# Patient Record
Sex: MALE | Race: ASIAN | NOT HISPANIC OR LATINO | URBAN - METROPOLITAN AREA
[De-identification: names, ages, dates, MRNs, and addresses within clinical notes are randomized per-mention and may not be internally consistent; named-entity substitution may affect disease eponyms.]

---

## 2020-04-02 ENCOUNTER — INPATIENT (INPATIENT)
Facility: HOSPITAL | Age: 75
LOS: 3 days | Discharge: HOME | End: 2020-04-06
Attending: HOSPITALIST | Admitting: HOSPITALIST
Payer: COMMERCIAL

## 2020-04-02 VITALS
RESPIRATION RATE: 20 BRPM | SYSTOLIC BLOOD PRESSURE: 139 MMHG | DIASTOLIC BLOOD PRESSURE: 72 MMHG | TEMPERATURE: 98 F | OXYGEN SATURATION: 91 % | HEART RATE: 76 BPM

## 2020-04-02 DIAGNOSIS — B34.9 VIRAL INFECTION, UNSPECIFIED: ICD-10-CM

## 2020-04-02 DIAGNOSIS — R63.0 ANOREXIA: ICD-10-CM

## 2020-04-02 DIAGNOSIS — R82.998 OTHER ABNORMAL FINDINGS IN URINE: ICD-10-CM

## 2020-04-02 DIAGNOSIS — R73.09 OTHER ABNORMAL GLUCOSE: ICD-10-CM

## 2020-04-02 DIAGNOSIS — R09.02 HYPOXEMIA: ICD-10-CM

## 2020-04-02 DIAGNOSIS — Z02.9 ENCOUNTER FOR ADMINISTRATIVE EXAMINATIONS, UNSPECIFIED: ICD-10-CM

## 2020-04-02 DIAGNOSIS — Z90.49 ACQUIRED ABSENCE OF OTHER SPECIFIED PARTS OF DIGESTIVE TRACT: Chronic | ICD-10-CM

## 2020-04-02 DIAGNOSIS — D72.829 ELEVATED WHITE BLOOD CELL COUNT, UNSPECIFIED: ICD-10-CM

## 2020-04-02 DIAGNOSIS — R68.89 OTHER GENERAL SYMPTOMS AND SIGNS: ICD-10-CM

## 2020-04-02 LAB
ALBUMIN SERPL ELPH-MCNC: 3.5 G/DL — SIGNIFICANT CHANGE UP (ref 3.3–5)
ALP SERPL-CCNC: 104 U/L — SIGNIFICANT CHANGE UP (ref 40–120)
ALT FLD-CCNC: 79 U/L — HIGH (ref 4–41)
ANION GAP SERPL CALC-SCNC: 13 MMO/L — SIGNIFICANT CHANGE UP (ref 7–14)
AST SERPL-CCNC: 84 U/L — HIGH (ref 4–40)
BASE EXCESS BLDV CALC-SCNC: 4.4 MMOL/L — SIGNIFICANT CHANGE UP
BASOPHILS # BLD AUTO: 0.04 K/UL — SIGNIFICANT CHANGE UP (ref 0–0.2)
BASOPHILS NFR BLD AUTO: 0.3 % — SIGNIFICANT CHANGE UP (ref 0–2)
BASOPHILS NFR SPEC: 0 % — SIGNIFICANT CHANGE UP (ref 0–2)
BILIRUB SERPL-MCNC: 0.7 MG/DL — SIGNIFICANT CHANGE UP (ref 0.2–1.2)
BLASTS # FLD: 0 % — SIGNIFICANT CHANGE UP (ref 0–0)
BLOOD GAS VENOUS - CREATININE: 0.73 MG/DL — SIGNIFICANT CHANGE UP (ref 0.5–1.3)
BUN SERPL-MCNC: 25 MG/DL — HIGH (ref 7–23)
CALCIUM SERPL-MCNC: 9.4 MG/DL — SIGNIFICANT CHANGE UP (ref 8.4–10.5)
CHLORIDE BLDV-SCNC: 103 MMOL/L — SIGNIFICANT CHANGE UP (ref 96–108)
CHLORIDE SERPL-SCNC: 99 MMOL/L — SIGNIFICANT CHANGE UP (ref 98–107)
CO2 SERPL-SCNC: 25 MMOL/L — SIGNIFICANT CHANGE UP (ref 22–31)
CREAT SERPL-MCNC: 0.79 MG/DL — SIGNIFICANT CHANGE UP (ref 0.5–1.3)
CRP SERPL-MCNC: 123.8 MG/L — HIGH
EOSINOPHIL # BLD AUTO: 0.01 K/UL — SIGNIFICANT CHANGE UP (ref 0–0.5)
EOSINOPHIL NFR BLD AUTO: 0.1 % — SIGNIFICANT CHANGE UP (ref 0–6)
EOSINOPHIL NFR FLD: 0 % — SIGNIFICANT CHANGE UP (ref 0–6)
FERRITIN SERPL-MCNC: 3295 NG/ML — HIGH (ref 30–400)
GAS PNL BLDV: 135 MMOL/L — LOW (ref 136–146)
GIANT PLATELETS BLD QL SMEAR: PRESENT — SIGNIFICANT CHANGE UP
GLUCOSE BLDV-MCNC: 177 MG/DL — HIGH (ref 70–99)
GLUCOSE SERPL-MCNC: 170 MG/DL — HIGH (ref 70–99)
HCO3 BLDV-SCNC: 27 MMOL/L — SIGNIFICANT CHANGE UP (ref 20–27)
HCT VFR BLD CALC: 48 % — SIGNIFICANT CHANGE UP (ref 39–50)
HCT VFR BLDV CALC: 51.1 % — HIGH (ref 39–51)
HGB BLD-MCNC: 15.9 G/DL — SIGNIFICANT CHANGE UP (ref 13–17)
HGB BLDV-MCNC: 16.7 G/DL — SIGNIFICANT CHANGE UP (ref 13–17)
IMM GRANULOCYTES NFR BLD AUTO: 2.3 % — HIGH (ref 0–1.5)
LACTATE BLDV-MCNC: 1.9 MMOL/L — SIGNIFICANT CHANGE UP (ref 0.5–2)
LDH SERPL L TO P-CCNC: 418 U/L — HIGH (ref 135–225)
LYMPHOCYTES # BLD AUTO: 0.64 K/UL — LOW (ref 1–3.3)
LYMPHOCYTES # BLD AUTO: 4.1 % — LOW (ref 13–44)
LYMPHOCYTES NFR SPEC AUTO: 0.9 % — LOW (ref 13–44)
MCHC RBC-ENTMCNC: 29.8 PG — SIGNIFICANT CHANGE UP (ref 27–34)
MCHC RBC-ENTMCNC: 33.1 % — SIGNIFICANT CHANGE UP (ref 32–36)
MCV RBC AUTO: 89.9 FL — SIGNIFICANT CHANGE UP (ref 80–100)
METAMYELOCYTES # FLD: 0 % — SIGNIFICANT CHANGE UP (ref 0–1)
MONOCYTES # BLD AUTO: 0.56 K/UL — SIGNIFICANT CHANGE UP (ref 0–0.9)
MONOCYTES NFR BLD AUTO: 3.6 % — SIGNIFICANT CHANGE UP (ref 2–14)
MONOCYTES NFR BLD: 0.9 % — LOW (ref 2–9)
MYELOCYTES NFR BLD: 0 % — SIGNIFICANT CHANGE UP (ref 0–0)
NEUTROPHIL AB SER-ACNC: 90.5 % — HIGH (ref 43–77)
NEUTROPHILS # BLD AUTO: 14.05 K/UL — HIGH (ref 1.8–7.4)
NEUTROPHILS NFR BLD AUTO: 89.6 % — HIGH (ref 43–77)
NEUTS BAND # BLD: 6 % — SIGNIFICANT CHANGE UP (ref 0–6)
NRBC # FLD: 0 K/UL — SIGNIFICANT CHANGE UP (ref 0–0)
OTHER - HEMATOLOGY %: 0 — SIGNIFICANT CHANGE UP
PCO2 BLDV: 45 MMHG — SIGNIFICANT CHANGE UP (ref 41–51)
PH BLDV: 7.42 PH — SIGNIFICANT CHANGE UP (ref 7.32–7.43)
PLATELET # BLD AUTO: 271 K/UL — SIGNIFICANT CHANGE UP (ref 150–400)
PLATELET COUNT - ESTIMATE: NORMAL — SIGNIFICANT CHANGE UP
PMV BLD: 10.3 FL — SIGNIFICANT CHANGE UP (ref 7–13)
PO2 BLDV: 33 MMHG — LOW (ref 35–40)
POIKILOCYTOSIS BLD QL AUTO: SIGNIFICANT CHANGE UP
POLYCHROMASIA BLD QL SMEAR: SIGNIFICANT CHANGE UP
POTASSIUM BLDV-SCNC: 4.1 MMOL/L — SIGNIFICANT CHANGE UP (ref 3.4–4.5)
POTASSIUM SERPL-MCNC: 4.2 MMOL/L — SIGNIFICANT CHANGE UP (ref 3.5–5.3)
POTASSIUM SERPL-SCNC: 4.2 MMOL/L — SIGNIFICANT CHANGE UP (ref 3.5–5.3)
PROCALCITONIN SERPL-MCNC: 0.09 NG/ML — SIGNIFICANT CHANGE UP (ref 0.02–0.1)
PROMYELOCYTES # FLD: 0 % — SIGNIFICANT CHANGE UP (ref 0–0)
PROT SERPL-MCNC: 7.5 G/DL — SIGNIFICANT CHANGE UP (ref 6–8.3)
RBC # BLD: 5.34 M/UL — SIGNIFICANT CHANGE UP (ref 4.2–5.8)
RBC # FLD: 12.2 % — SIGNIFICANT CHANGE UP (ref 10.3–14.5)
SAO2 % BLDV: 59.2 % — LOW (ref 60–85)
SODIUM SERPL-SCNC: 137 MMOL/L — SIGNIFICANT CHANGE UP (ref 135–145)
VARIANT LYMPHS # BLD: 1.7 % — SIGNIFICANT CHANGE UP
WBC # BLD: 15.66 K/UL — HIGH (ref 3.8–10.5)
WBC # FLD AUTO: 15.66 K/UL — HIGH (ref 3.8–10.5)

## 2020-04-02 PROCEDURE — 71045 X-RAY EXAM CHEST 1 VIEW: CPT | Mod: 26

## 2020-04-02 PROCEDURE — 99233 SBSQ HOSP IP/OBS HIGH 50: CPT

## 2020-04-02 RX ORDER — ENOXAPARIN SODIUM 100 MG/ML
40 INJECTION SUBCUTANEOUS DAILY
Refills: 0 | Status: DISCONTINUED | OUTPATIENT
Start: 2020-04-02 | End: 2020-04-06

## 2020-04-02 RX ORDER — ALBUTEROL 90 UG/1
2 AEROSOL, METERED ORAL ONCE
Refills: 0 | Status: COMPLETED | OUTPATIENT
Start: 2020-04-02 | End: 2020-04-02

## 2020-04-02 RX ORDER — ZINC SULFATE TAB 220 MG (50 MG ZINC EQUIVALENT) 220 (50 ZN) MG
220 TAB ORAL DAILY
Refills: 0 | Status: DISCONTINUED | OUTPATIENT
Start: 2020-04-02 | End: 2020-04-06

## 2020-04-02 RX ORDER — ACETAMINOPHEN 500 MG
650 TABLET ORAL EVERY 6 HOURS
Refills: 0 | Status: DISCONTINUED | OUTPATIENT
Start: 2020-04-02 | End: 2020-04-06

## 2020-04-02 RX ORDER — ASCORBIC ACID 60 MG
500 TABLET,CHEWABLE ORAL THREE TIMES A DAY
Refills: 0 | Status: DISCONTINUED | OUTPATIENT
Start: 2020-04-02 | End: 2020-04-06

## 2020-04-02 RX ORDER — ACETAMINOPHEN 500 MG
650 TABLET ORAL ONCE
Refills: 0 | Status: COMPLETED | OUTPATIENT
Start: 2020-04-02 | End: 2020-04-02

## 2020-04-02 RX ORDER — ALBUTEROL 90 UG/1
2 AEROSOL, METERED ORAL EVERY 6 HOURS
Refills: 0 | Status: DISCONTINUED | OUTPATIENT
Start: 2020-04-02 | End: 2020-04-06

## 2020-04-02 RX ORDER — SODIUM CHLORIDE 9 MG/ML
1000 INJECTION, SOLUTION INTRAVENOUS
Refills: 0 | Status: COMPLETED | OUTPATIENT
Start: 2020-04-02 | End: 2020-04-03

## 2020-04-02 RX ADMIN — Medication 650 MILLIGRAM(S): at 14:43

## 2020-04-02 RX ADMIN — ALBUTEROL 2 PUFF(S): 90 AEROSOL, METERED ORAL at 14:44

## 2020-04-02 RX ADMIN — Medication 100 MILLIGRAM(S): at 14:43

## 2020-04-02 NOTE — H&P ADULT - PROBLEM SELECTOR PLAN 7
.  1.  PCP Name                           =   2.  PCP Contacted at Admission =  [  ] Y       [  ] N          [  ] N/A  3.  PCP Contacted at Discharge  =  [  ] Y       [  ] N          [  ] N/A  4.  Post-Discharge Appointment Date and Location =   5.  Summary of Handoff Given to PCP                    =

## 2020-04-02 NOTE — H&P ADULT - NSHPSOCIALHISTORY_GEN_ALL_CORE
SOCIAL HISTORY:    Marital Status:  (  )    (  ) Single        (  )         (  )   Occupation:   Lives with:        (  ) alone        (  ) children    (  ) spouse           (  ) parents            (  ) other    No history of smoking  No history of alcohol abuse  No history of illegal drug use    Independently ambulatory at baseline SOCIAL HISTORY:    Marital Status:  ( x )    (  ) Single        (  )         (  )   Occupation:   Lives with:        (  ) alone        (  ) children    ( x ) spouse           (  ) parents            (  ) other    No history of smoking  No history of alcohol abuse  No history of illegal drug use    Independently ambulatory at baseline

## 2020-04-02 NOTE — H&P ADULT - NSHPREVIEWOFSYSTEMS_GEN_ALL_CORE
- per ED  REVIEW OF SYSTEMS:    CONSTITUTIONAL: Fatigue.  Anorexia.  No fevers or chills  EYES/ENT: No visual changes.  No dysphagia  NECK: No pain or stiffness  RESPIRATORY: Shortness of breath.  Cough (chronic - thought to be associated w/ food allergies, per wife)  CARDIOVASCULAR: No chest pain or palpitation.  No lower extremity edema  GASTROINTESTINAL: No abdominal or epigastric pain. No nausea, vomiting or hematemesis.  No diarrhea or constipation. No melena or hematochezia.  GENITOURINARY: No dysuria, frequency or hematuria  MUSCULOSKELETAL: Body aches.  No swelling, decreased ROM, erythema, warmth  NEUROLOGICAL: No numbness or weakness  PSYCHIATRY: No anxiety, or depression.  SKIN: No itching, burning, rashes, or lesions   All other review of systems is negative unless indicated above.

## 2020-04-02 NOTE — ED PROVIDER NOTE - CLINICAL SUMMARY MEDICAL DECISION MAKING FREE TEXT BOX
74 year old M, no PMHx, here c/o 9 days of severe fatigue, now with shortness of breath. 74 year old M w/ likely COVID infection given hypoxia. Plan labs, cxr, likely admit given hypoxia.

## 2020-04-02 NOTE — H&P ADULT - PROBLEM SELECTOR PLAN 2
- signs and symptoms suggestive of same.    - CXR w/ findings as above  - management as above  - may need Plaquenil if positive COVID-19 culture

## 2020-04-02 NOTE — H&P ADULT - PROBLEM SELECTOR PLAN 1
- sent from [facility] due to hypoxia at 90 (91 in the ED on RA, w/ improvement to 97% on nasal cannula)  - history of shortness of breath, CHAVEZ.  Has chronic intermittent cough - thought to be associated with undiagnosed food allergy  - also w/ fatigue, weakness, anorexia  - max respiratory rate = 30 in ED  - CXR w/ finding of "Scattered airspace opacities identified bilaterally, right greater than left, consistent with bilateral pneumonia."  - adm w/ suspicion for 2019 Novel Coronavirus infection (lymphopenia = 4.1, ferritin = 3295, LDH = 418, CPR = 123.8)  - follow pulse oximetry  - antitussive PRN  - albuterol PRN  - f/u COVID-19 PCR (collected)  - supportive care, as needed

## 2020-04-02 NOTE — H&P ADULT - NSHPPHYSICALEXAM_GEN_ALL_CORE
PHYSICAL EXAMINATION:    APPEARANCE: Well groomed, adequately nourished.  NAD	  HEENT: Normal oral mucosa, PERRL, EOMI	  LYMPHATIC: No lymphadenopathy appreciated  CARDIOVASCULAR: (+) S1 S2.  No JVD.  No murmurs.  No edema  RESPIRATORY: No wheezing, rhonchi, crackles appreciated  PSYCHIATRIC: A & O x 3.  Mood & affect appropriate to situation  GASTROINTESTINAL:  Soft, Non-tender, + BS	  SKIN: No rashes. No ecchymoses.  No cyanosis	  NEUROLOGICAL: Non-focal, A&Ox3, nonfocal, ANDERSON x 4 against gravity  EXTREMITIES: Normal range of motion.  No clubbing, cyanosis or edema  VASCULAR: Peripheral pulses palpable 2+ bilaterally

## 2020-04-02 NOTE — H&P ADULT - NSICDXPASTMEDICALHX_GEN_ALL_CORE_FT
PAST MEDICAL HISTORY:  History of smoking PAST MEDICAL HISTORY:  History of smoking ~ stopped in 1984    Transient ischemic attack (TIA) ~ suspected in ~ 2000 at [facility]

## 2020-04-02 NOTE — ED PROVIDER NOTE - OBJECTIVE STATEMENT
74 year old M, no PMHx, here c/o 9 days of severe fatigue, now with shortness of breath. Unable to exert himself w/o dyspnea. No known sick contacts, no travel. Denies fevers, chills, or cough. +hematuria this morning per son. Admits to anorexia, has not eaten in a week. Was seen at [facility] this morning and sent to the ED due to low pulse ox of 90%.

## 2020-04-02 NOTE — H&P ADULT - PROBLEM SELECTOR PLAN 4
- WBC = 15.66, with Bands = 6  - Procalcitonin level, however, not elevated (0.09)  - no antibiotic therapy presently  - f/u trend w/ repeat lab-work

## 2020-04-02 NOTE — ED PROVIDER NOTE - ATTENDING CONTRIBUTION TO CARE
I performed a history and physical exam of the patient and discussed their management with the PA/NP.  I reviewed the ACP's note and agree with the documented findings and plan of care except as noted below. My medical decision making and observations are as follows:    74 year old M, no PMHx, here c/o 9 days of severe fatigue, now with shortness of breath and found to be hypoxic at rest on RA .  Pt tachypneic with crackles bilateral lung fields - w/ likely COVID infection given hypoxia. Plan labs, cxr, likely admit given hypoxia.

## 2020-04-02 NOTE — ED PROVIDER NOTE - NS ED ROS FT
ROS:  GENERAL: no fever, no chills, +fatigue, +anorexia   EYES: no change in vision  HEENT: no trouble swallowing, no trouble speaking  CARDIAC: no chest pain  PULMONARY: no cough, +shortness of breath  GI: no abdominal pain, no nausea, no vomiting, no diarrhea, no constipation  : No dysuria, no frequency, +hematuria  SKIN: no rashes  NEURO: no headache, no weakness  MSK: +body aches

## 2020-04-02 NOTE — H&P ADULT - PROBLEM SELECTOR PLAN 3
- glucose = 170 on CMP  - no personal history of DM, but FH w/ father having DM  - f/u HgbA1c level in the AM  - consistent carb diet  - will not do ISS per FS for now, pending HgbA1c level (please re-evaluate)

## 2020-04-02 NOTE — H&P ADULT - NSHPLABSRESULTS_GEN_ALL_CORE
Vital Signs Last 24 Hrs  T(C): 36.8 (02 Apr 2020 18:15), Max: 36.9 (02 Apr 2020 13:37)  T(F): 98.2 (02 Apr 2020 18:15), Max: 98.4 (02 Apr 2020 13:37)  HR: 60 (02 Apr 2020 18:15) (60 - 78)  BP: 110/68 (02 Apr 2020 18:15) (110/68 - 139/72)  BP(mean): --  RR: 20 (02 Apr 2020 18:15) (20 - 30)  SpO2: 96% (02 Apr 2020 18:15) (91% - 97%)    ==========================================================                          15.9   15.66 )-----------( 271      ( 02 Apr 2020 14:29 )             48.0     02 Apr 2020 14:29    137    |  99     |  25     ----------------------------<  170    4.2     |  25     |  0.79     Ca    9.4        02 Apr 2020 14:29    TPro  7.5    /  Alb  3.5    /  TBili  0.7    /  DBili  x      /  AST  84     /  ALT  79     /  AlkPhos  104    02 Apr 2020 14:29    LIVER FUNCTIONS - ( 02 Apr 2020 14:29 )  Alb: 3.5 g/dL / Pro: 7.5 g/dL / ALK PHOS: 104 u/L / ALT: 79 u/L / AST: 84 u/L / GGT: x             CAPILLARY BLOOD GLUCOSE    ==========================================================      ==========================================================

## 2020-04-02 NOTE — H&P ADULT - PROBLEM SELECTOR PLAN 6
- minimal oral intake x ~ 9 days, in the setting of infection - likely COVID-19  - diet prescribed  - for one liter LR at 75 mL/Hr  - encourage oral intake, as tolerated

## 2020-04-02 NOTE — H&P ADULT - ASSESSMENT
74 year old male, with past history significant for Smoking, presented to the ED, after being sent from [facility], due to hypoxia.  Patient had been fatigued, had generalized weakness, shortness of breath, CHAVEZ and anorexia for more than one week.  Today, patient noted hematuria.  In the ED, patient was tachypneic and had crackles over bilateral lung fields.  Vital signs upon ED presentation as follows: BP = 139/72, HR = 76, RR = 20 (to max of 30), T = 36.7 C (98.1 F), O2 Sat = 91% on RA (97 on 4L NC). 74 year old male, with past history significant for Smoking, presented to the ED, after being sent from [facility], due to hypoxia.  Wife reports that patient had been fatigued, had generalized weakness, shortness of breath, CHAVEZ and anorexia for more than one week.  Today, patient noted hematuria.  In the ED, patient was tachypneic and had crackles over bilateral lung fields.  Vital signs upon ED presentation as follows: BP = 139/72, HR = 76, RR = 20 (to max of 30), T = 36.7 C (98.1 F), O2 Sat = 91% on RA (97 on 4L NC). 74 year old male, with past history significant for Smoking, presented to the ED, after being sent from [facility], due to hypoxia.  Spoke with wife reports that patient had been fatigued, had generalized weakness, shortness of breath, CHAVEZ and anorexia for more than one week.  Vital signs upon ED presentation as follows: BP = 139/72, HR = 76, RR = 20 (to max of 30), T = 36.7 C (98.1 F), O2 Sat = 91% on RA (97 on 4L NC).

## 2020-04-02 NOTE — H&P ADULT - HISTORY OF PRESENT ILLNESS
74 year old male, with past history significant for Smoking, presented to the ED, after being sent from [facility], due to hypoxia.  Patient had been fatigued, had generalized weakness, shortness of breath, CHAVEZ and anorexia for more than one week.  Today, patient noted hematuria.  In the ED, patient was tachypneic to 30, and had crackles over bilateral lung fields.    Vital signs upon ED presentation as follows: BP = 139/72, HR = 76, RR = 20 (to max of 30), T = 36.7 C (98.1 F), O2 Sat = 91% on RA (97 on 4L NC). 74 year old male, with past history significant for Smoking, presented to the ED, after being sent from [facility], due to hypoxia.  Wife reports that patient had been fatigued, had generalized weakness, shortness of breath, CHAVEZ and anorexia for more than one week.  Today, patient noted dark red urine, presumed to be blood.  In the ED, patient was tachypneic to 30, and had crackles over bilateral lung fields.    Vital signs upon ED presentation as follows: BP = 139/72, HR = 76, RR = 20 (to max of 30), T = 36.7 C (98.1 F), O2 Sat = 91% on RA (97 on 4L NC). 74 year old male, with past history significant for Smoking, presented to the ED, after being sent from [facility], due to hypoxia.  Spoke with wife reports that patient had been fatigued, had generalized weakness, shortness of breath, CHAVEZ and anorexia for more than one week.  Today, patient noted dark red urine, presumed to be blood.  In the ED, patient was tachypneic to 30, and had crackles over bilateral lung fields.    Vital signs upon ED presentation as follows: BP = 139/72, HR = 76, RR = 20 (to max of 30), T = 36.7 C (98.1 F), O2 Sat = 91% on RA (97 on 4L NC).

## 2020-04-02 NOTE — H&P ADULT - PROBLEM SELECTOR PLAN 5
.  1.  PCP Name                           =   2.  PCP Contacted at Admission =  [  ] Y       [  ] N          [  ] N/A  3.  PCP Contacted at Discharge  =  [  ] Y       [  ] N          [  ] N/A  4.  Post-Discharge Appointment Date and Location =   5.  Summary of Handoff Given to PCP                    = - dark-red, per wife, and noted in the AM prior to coming to the ED  - no associated dysuria - including burning or itching  - hematuria vs myoglobinuria  - on one liter LR at 75 mL/Hr  - urinalysis ordered; please f/u

## 2020-04-02 NOTE — ED PROVIDER NOTE - PHYSICAL EXAMINATION
Vital signs reviewed.   CONSTITUTIONAL: Well-appearing; well-nourished; in no apparent distress. Non-toxic appearing.   HEAD: Normocephalic, atraumatic.  EYES: PERRL, EOM intact, conjunctiva and sclera WNL.  ENT: normal nose; no rhinorrhea; normal pharynx with no tonsillar hypertrophy, no erythema, no exudate, no lymphadenopathy.  NECK/LYMPH: Supple; non-tender; no cervical lymphadenopathy.  CARD: RRR, Normal S1, S2; no LE edema or calf tenderness  RESP: Normal chest excursion with respiration; rales to right lower lobe  ABD/GI: soft, non-distended; non-tender; no palpable organomegaly, no pulsatile mass.  EXT/MS: moves all extremities; distal pulses are normal, no pedal edema.  SKIN: Normal for age and race; warm; dry; good turgor; no apparent lesions or exudate noted.  NEURO: Awake, alert, oriented x 3, no gross deficits, CN II-XII grossly intact, no motor or sensory deficit noted.  PSYCH: Normal mood; appropriate affect.

## 2020-04-03 DIAGNOSIS — B34.2 CORONAVIRUS INFECTION, UNSPECIFIED: ICD-10-CM

## 2020-04-03 LAB
24R-OH-CALCIDIOL SERPL-MCNC: 14.5 NG/ML — LOW (ref 30–80)
ALBUMIN SERPL ELPH-MCNC: 3.3 G/DL — SIGNIFICANT CHANGE UP (ref 3.3–5)
ALP SERPL-CCNC: 93 U/L — SIGNIFICANT CHANGE UP (ref 40–120)
ALT FLD-CCNC: 67 U/L — HIGH (ref 4–41)
ANION GAP SERPL CALC-SCNC: 9 MMO/L — SIGNIFICANT CHANGE UP (ref 7–14)
APPEARANCE UR: CLEAR — SIGNIFICANT CHANGE UP
AST SERPL-CCNC: 57 U/L — HIGH (ref 4–40)
BACTERIA # UR AUTO: NEGATIVE — SIGNIFICANT CHANGE UP
BASOPHILS # BLD AUTO: 0.04 K/UL — SIGNIFICANT CHANGE UP (ref 0–0.2)
BASOPHILS NFR BLD AUTO: 0.3 % — SIGNIFICANT CHANGE UP (ref 0–2)
BILIRUB SERPL-MCNC: 0.6 MG/DL — SIGNIFICANT CHANGE UP (ref 0.2–1.2)
BILIRUB UR-MCNC: NEGATIVE — SIGNIFICANT CHANGE UP
BLOOD UR QL VISUAL: NEGATIVE — SIGNIFICANT CHANGE UP
BUN SERPL-MCNC: 23 MG/DL — SIGNIFICANT CHANGE UP (ref 7–23)
CALCIUM SERPL-MCNC: 9 MG/DL — SIGNIFICANT CHANGE UP (ref 8.4–10.5)
CHLORIDE SERPL-SCNC: 98 MMOL/L — SIGNIFICANT CHANGE UP (ref 98–107)
CK MB BLD-MCNC: < 1 NG/ML — LOW (ref 1–6.6)
CK MB BLD-MCNC: SIGNIFICANT CHANGE UP (ref 0–2.5)
CK SERPL-CCNC: 55 U/L — SIGNIFICANT CHANGE UP (ref 30–200)
CO2 SERPL-SCNC: 27 MMOL/L — SIGNIFICANT CHANGE UP (ref 22–31)
COLOR SPEC: YELLOW — SIGNIFICANT CHANGE UP
CREAT SERPL-MCNC: 0.67 MG/DL — SIGNIFICANT CHANGE UP (ref 0.5–1.3)
EOSINOPHIL # BLD AUTO: 0.02 K/UL — SIGNIFICANT CHANGE UP (ref 0–0.5)
EOSINOPHIL NFR BLD AUTO: 0.2 % — SIGNIFICANT CHANGE UP (ref 0–6)
FERRITIN SERPL-MCNC: 2498 NG/ML — HIGH (ref 30–400)
GLUCOSE SERPL-MCNC: 124 MG/DL — HIGH (ref 70–99)
GLUCOSE UR-MCNC: NEGATIVE — SIGNIFICANT CHANGE UP
HBA1C BLD-MCNC: 6.2 % — HIGH (ref 4–5.6)
HCT VFR BLD CALC: 46.2 % — SIGNIFICANT CHANGE UP (ref 39–50)
HCV AB S/CO SERPL IA: 0.21 S/CO — SIGNIFICANT CHANGE UP (ref 0–0.99)
HCV AB SERPL-IMP: SIGNIFICANT CHANGE UP
HGB BLD-MCNC: 15.3 G/DL — SIGNIFICANT CHANGE UP (ref 13–17)
IMM GRANULOCYTES NFR BLD AUTO: 1.6 % — HIGH (ref 0–1.5)
KETONES UR-MCNC: NEGATIVE — SIGNIFICANT CHANGE UP
LDH SERPL L TO P-CCNC: 390 U/L — HIGH (ref 135–225)
LEUKOCYTE ESTERASE UR-ACNC: NEGATIVE — SIGNIFICANT CHANGE UP
LYMPHOCYTES # BLD AUTO: 0.9 K/UL — LOW (ref 1–3.3)
LYMPHOCYTES # BLD AUTO: 7.3 % — LOW (ref 13–44)
MAGNESIUM SERPL-MCNC: 2.5 MG/DL — SIGNIFICANT CHANGE UP (ref 1.6–2.6)
MCHC RBC-ENTMCNC: 30 PG — SIGNIFICANT CHANGE UP (ref 27–34)
MCHC RBC-ENTMCNC: 33.1 % — SIGNIFICANT CHANGE UP (ref 32–36)
MCV RBC AUTO: 90.6 FL — SIGNIFICANT CHANGE UP (ref 80–100)
MONOCYTES # BLD AUTO: 0.61 K/UL — SIGNIFICANT CHANGE UP (ref 0–0.9)
MONOCYTES NFR BLD AUTO: 5 % — SIGNIFICANT CHANGE UP (ref 2–14)
NEUTROPHILS # BLD AUTO: 10.49 K/UL — HIGH (ref 1.8–7.4)
NEUTROPHILS NFR BLD AUTO: 85.6 % — HIGH (ref 43–77)
NITRITE UR-MCNC: NEGATIVE — SIGNIFICANT CHANGE UP
NRBC # FLD: 0 K/UL — SIGNIFICANT CHANGE UP (ref 0–0)
PH UR: 6.5 — SIGNIFICANT CHANGE UP (ref 5–8)
PHOSPHATE SERPL-MCNC: 2.5 MG/DL — SIGNIFICANT CHANGE UP (ref 2.5–4.5)
PLATELET # BLD AUTO: 257 K/UL — SIGNIFICANT CHANGE UP (ref 150–400)
PMV BLD: 10.2 FL — SIGNIFICANT CHANGE UP (ref 7–13)
POTASSIUM SERPL-MCNC: 4.3 MMOL/L — SIGNIFICANT CHANGE UP (ref 3.5–5.3)
POTASSIUM SERPL-SCNC: 4.3 MMOL/L — SIGNIFICANT CHANGE UP (ref 3.5–5.3)
PROT SERPL-MCNC: 7 G/DL — SIGNIFICANT CHANGE UP (ref 6–8.3)
PROT UR-MCNC: 100 — HIGH
RBC # BLD: 5.1 M/UL — SIGNIFICANT CHANGE UP (ref 4.2–5.8)
RBC # FLD: 12.2 % — SIGNIFICANT CHANGE UP (ref 10.3–14.5)
RBC CASTS # UR COMP ASSIST: SIGNIFICANT CHANGE UP (ref 0–?)
SARS-COV-2 RNA SPEC QL NAA+PROBE: DETECTED
SODIUM SERPL-SCNC: 134 MMOL/L — LOW (ref 135–145)
SP GR SPEC: 1.04 — SIGNIFICANT CHANGE UP (ref 1–1.04)
SQUAMOUS # UR AUTO: SIGNIFICANT CHANGE UP
TSH SERPL-MCNC: 0.86 UIU/ML — SIGNIFICANT CHANGE UP (ref 0.27–4.2)
UROBILINOGEN FLD QL: NORMAL — SIGNIFICANT CHANGE UP
WBC # BLD: 12.25 K/UL — HIGH (ref 3.8–10.5)
WBC # FLD AUTO: 12.25 K/UL — HIGH (ref 3.8–10.5)
WBC UR QL: HIGH (ref 0–?)

## 2020-04-03 PROCEDURE — 99233 SBSQ HOSP IP/OBS HIGH 50: CPT

## 2020-04-03 RX ORDER — HYDROXYCHLOROQUINE SULFATE 200 MG
TABLET ORAL
Refills: 0 | Status: DISCONTINUED | OUTPATIENT
Start: 2020-04-03 | End: 2020-04-06

## 2020-04-03 RX ORDER — HYDROXYCHLOROQUINE SULFATE 200 MG
200 TABLET ORAL EVERY 12 HOURS
Refills: 0 | Status: DISCONTINUED | OUTPATIENT
Start: 2020-04-04 | End: 2020-04-06

## 2020-04-03 RX ORDER — ERGOCALCIFEROL 1.25 MG/1
50000 CAPSULE ORAL
Refills: 0 | Status: DISCONTINUED | OUTPATIENT
Start: 2020-04-03 | End: 2020-04-06

## 2020-04-03 RX ORDER — HYDROXYCHLOROQUINE SULFATE 200 MG
400 TABLET ORAL EVERY 12 HOURS
Refills: 0 | Status: COMPLETED | OUTPATIENT
Start: 2020-04-03 | End: 2020-04-03

## 2020-04-03 RX ADMIN — ERGOCALCIFEROL 50000 UNIT(S): 1.25 CAPSULE ORAL at 14:24

## 2020-04-03 RX ADMIN — SODIUM CHLORIDE 75 MILLILITER(S): 9 INJECTION, SOLUTION INTRAVENOUS at 01:00

## 2020-04-03 RX ADMIN — Medication 500 MILLIGRAM(S): at 21:22

## 2020-04-03 RX ADMIN — Medication 500 MILLIGRAM(S): at 08:44

## 2020-04-03 RX ADMIN — ZINC SULFATE TAB 220 MG (50 MG ZINC EQUIVALENT) 220 MILLIGRAM(S): 220 (50 ZN) TAB at 14:23

## 2020-04-03 RX ADMIN — Medication 400 MILLIGRAM(S): at 21:22

## 2020-04-03 RX ADMIN — Medication 500 MILLIGRAM(S): at 14:23

## 2020-04-03 RX ADMIN — ENOXAPARIN SODIUM 40 MILLIGRAM(S): 100 INJECTION SUBCUTANEOUS at 14:22

## 2020-04-03 RX ADMIN — Medication 400 MILLIGRAM(S): at 14:23

## 2020-04-03 RX ADMIN — Medication 500 MILLIGRAM(S): at 02:11

## 2020-04-03 NOTE — DISCHARGE NOTE PROVIDER - HOSPITAL COURSE
75 y/o M w/ Hx Smoking, p/w Hypoxia. COVID-19 CXR -Scattered airspace opacities identified bilaterally, R>L c/w bilateral. pneumonia. Pt has been treated w/ antitussive, albuterol PRN, Started on Plaquenil        Elevated glucose level: - glucose = 170 on CMP.   HgbA1c 6.2 %, diet changed to consistent carb diet 75 y/o M w/ Hx Smoking, p/w Hypoxia. COVID-19 CXR -Scattered airspace opacities identified bilaterally, R>L c/w bilateral. pneumonia. Pt has been treated w/ antitussive, albuterol PRN, Started on Plaquenil on 4/3, and refused to continue on 4/5        Elevated glucose level: - glucose = 170 on CMP.   HgbA1c 6.2 %, diet changed to consistent carb diet         Patient admitted for hypoxia. Patient is now saturating 93% on RA and with ambulation. 73 y/o M w/ Hx Smoking, p/w Hypoxia. COVID-19 CXR -Scattered airspace opacities identified bilaterally, R>L c/w bilateral. pneumonia. Pt has been treated w/ antitussive, albuterol PRN, Started on Plaquenil on 4/3, and refused to continue on 4/5        Elevated glucose level: - glucose = 170 on CMP.   HgbA1c 6.2 %, diet changed to consistent carb diet         Patient admitted for hypoxia. Patient is now saturating 93% on RA and with ambulation.         As per Dr. Lopes, patient is cleared to be discharged for today. 75 y/o M w/ Hx Smoking, p/w Hypoxia. COVID-19 CXR -Scattered airspace opacities identified bilaterally, R>L c/w bilateral. pneumonia. Pt has been treated w/ antitussive, albuterol PRN, Started on Plaquenil on 4/3, and refused to continue on 4/5        Elevated glucose level: - glucose = 170 on CMP.   HgbA1c 6.2 %, diet changed to consistent carb diet         Patient admitted for hypoxia. Patient is now saturating 93% on RA and with ambulation. Discussed with ID attending ok for d/c home    As per Dr. Lopes, patient is cleared to be discharged for today.

## 2020-04-03 NOTE — CHART NOTE - NSCHARTNOTEFT_GEN_A_CORE
Spoke w/pt's spouse  Marge [phone], updated with a hospital course, + Covid test, and dispo plan, all questions answered.    [place] [phone]

## 2020-04-03 NOTE — DISCHARGE NOTE PROVIDER - NSDCMRMEDTOKEN_GEN_ALL_CORE_FT
acetaminophen 500 mg oral tablet: 1-2 tab(s) orally every 6 hours as needed for fever and/or pain acetaminophen 500 mg oral tablet: 1-2 tab(s) orally every 6 hours as needed for fever and/or pain  albuterol 90 mcg/inh inhalation aerosol: 2 puff(s) inhaled every 6 hours acetaminophen 500 mg oral tablet: 1-2 tab(s) orally every 6 hours as needed for fever and/or pain  albuterol 90 mcg/inh inhalation aerosol: 2 puff(s) inhaled every 6 hours  Plaquenil 200 mg oral tablet: 200  orally 2 times a day

## 2020-04-03 NOTE — DISCHARGE NOTE PROVIDER - NSDCCAREPROVSEEN_GEN_ALL_CORE_FT
Rosalia Mae  [facility] Medicine ACP, Team [facility] Medicine ACP, Team  Joanne Lopes

## 2020-04-03 NOTE — DISCHARGE NOTE PROVIDER - NSDCFUADDINST_GEN_ALL_CORE_FT
Patient should remain in isolation for a total of 14 days from time of discharge. Patient was diagnosed on //2020 and would be expected to complete isolation on or after //2020.    Those caring for the patient should maintain strict hand hygiene and avoid touching face as much as possible. If any family members develop shortness of breath or fevers they should contact their primary care provider as soon as possible. Patient should remain in isolation for a total of 14 days from time of discharge. Patient was diagnosed on 4/2/2020 and would be expected to complete isolation on or after 4/17/2020.    Those caring for the patient should maintain strict hand hygiene and avoid touching face as much as possible. If any family members develop shortness of breath or fevers they should contact their primary care provider as soon as possible.

## 2020-04-03 NOTE — DISCHARGE NOTE PROVIDER - NSDCCPCAREPLAN_GEN_ALL_CORE_FT
PRINCIPAL DISCHARGE DIAGNOSIS  Diagnosis: Suspected 2019 novel coronavirus infection  Assessment and Plan of Treatment: you were tested for COVID-19   Chest X-ray showed-Scattered airspace opacities identified bilaterally, Right >Left  c/w bilateral. pneumonia.  you have been  treated w/ antitussive, albuterol PRN, Started on Plaquenil        SECONDARY DISCHARGE DIAGNOSES  Diagnosis: Elevated glucose level  Assessment and Plan of Treatment: HgbA1c 6.2 %, follow consistent carb diet   follow up w/ PCP in 2 weeks post discharge for further management    Diagnosis: Hypoxia  Assessment and Plan of Treatment: resolved PRINCIPAL DISCHARGE DIAGNOSIS  Diagnosis: Suspected 2019 novel coronavirus infection  Assessment and Plan of Treatment: you were tested for COVID-19   Chest X-ray showed-Scattered airspace opacities identified bilaterally, Right >Left  c/w bilateral. pneumonia.  you have been  treated w/ antitussive, albuterol PRN, Started on Plaquenil  You have been diagnosed with the COVID-19 virus during your hospital stay. You must self quarantine to complete a 14 day time period.  Monitor for fevers, shortness of breath and cough primarily.  Monitor your temperature daily to not any changes and increases.    It has been determined that you no longer need hospitalization and can recover while remaining in self-quarantine at home. You should follow the prevention steps below until a healthcare provider or local or state health department says you can return to your normal activities.  1. You should restrict activities outside your home, except for getting medical care.  2. Do not go to work, school, or public areas.  3. Avoid using public transportation, ride-sharing, or taxis.  4. Separate yourself from other people and animals in your home.  5. Call ahead before visiting your doctor.  6. Wear a facemask.  7. Cover your coughs and sneezes.  8. Clean your hands often.  9. Avoid sharing personal household items.  10. Clean all “high-touch” surfaces everyday.  11. Monitor your symptoms.  If you have a medical emergency and need to call 911, notify the dispatch personnel that you have COVID-19 If possible, put on a facemask before emergency medical services arrive.  12. Stopping home isolation.  Patients with confirmed COVID-19 should remain under home isolation precautions for 14 days since the positive COVID-19 test and until the risk of secondary transmission to others is thought to be low. The decision to discontinue home isolation precautions should be made on a case-by-case basis, in consultation with healthcare providers and state and local health departments. Your [facility] Department of Health can be reached at [phone] for further information about COVID-19.         SECONDARY DISCHARGE DIAGNOSES  Diagnosis: Elevated glucose level  Assessment and Plan of Treatment: HgbA1c 6.2 %, follow consistent carb diet   follow up w/ PCP in 2 weeks post discharge for further management    Diagnosis: Hypoxia  Assessment and Plan of Treatment: resolved PRINCIPAL DISCHARGE DIAGNOSIS  Diagnosis: Suspected 2019 novel coronavirus infection  Assessment and Plan of Treatment: you were tested for COVID-19   Chest X-ray showed-Scattered airspace opacities identified bilaterally, Right >Left  c/w bilateral. pneumonia.  you have been  treated w/ antitussive, albuterol PRN, Started on Plaqueil.   You have been diagnosed with the COVID-19 virus during your hospital stay. You must self quarantine to complete a 14 day time period.  Monitor for fevers, shortness of breath and cough primarily.  Monitor your temperature daily to not any changes and increases.    It has been determined that you no longer need hospitalization and can recover while remaining in self-quarantine at home. You should follow the prevention steps below until a healthcare provider or local or state health department says you can return to your normal activities.  1. You should restrict activities outside your home, except for getting medical care.  2. Do not go to work, school, or public areas.  3. Avoid using public transportation, ride-sharing, or taxis.  4. Separate yourself from other people and animals in your home.  5. Call ahead before visiting your doctor.  6. Wear a facemask.  7. Cover your coughs and sneezes.  8. Clean your hands often.  9. Avoid sharing personal household items.  10. Clean all “high-touch” surfaces everyday.  11. Monitor your symptoms.  If you have a medical emergency and need to call 911, notify the dispatch personnel that you have COVID-19 If possible, put on a facemask before emergency medical services arrive.  12. Stopping home isolation.  Patients with confirmed COVID-19 should remain under home isolation precautions for 14 days since the positive COVID-19 test and until the risk of secondary transmission to others is thought to be low. The decision to discontinue home isolation precautions should be made on a case-by-case basis, in consultation with healthcare providers and state and local health departments. Your [facility] Department of Health can be reached at [phone] for further information about COVID-19.         SECONDARY DISCHARGE DIAGNOSES  Diagnosis: Elevated glucose level  Assessment and Plan of Treatment: HgbA1c 6.2 %, follow consistent carb diet   follow up w/ PCP in 2 weeks post discharge for further management    Diagnosis: Hypoxia  Assessment and Plan of Treatment: resolved

## 2020-04-03 NOTE — PROGRESS NOTE ADULT - ASSESSMENT
74 year old male, with past history significant for Smoking, presented to the ED, after being sent from [facility], due to hypoxia found to have COVID19 infection

## 2020-04-03 NOTE — PROGRESS NOTE ADULT - SUBJECTIVE AND OBJECTIVE BOX
Patient is a 74y old  Male who presents with a chief complaint of Suspected 2019 Novel Coronavirus Infection and Hypoxia (2020 12:25)    SUBJECTIVE / OVERNIGHT EVENTS: Denies cough but reports shorntess of breath. Denies any fevers. No chest pain. Saturating 93% on 2L NC    MEDICATIONS  (STANDING):  ascorbic acid 500 milliGRAM(s) Oral three times a day  enoxaparin Injectable 40 milliGRAM(s) SubCutaneous daily  ergocalciferol 06358 Unit(s) Oral <User Schedule>  hydroxychloroquine   Oral   hydroxychloroquine 400 milliGRAM(s) Oral every 12 hours  zinc sulfate 220 milliGRAM(s) Oral daily    MEDICATIONS  (PRN):  acetaminophen   Tablet .. 650 milliGRAM(s) Oral every 6 hours PRN Temp greater or equal to 38C (100.4F), Mild Pain (1 - 3)  ALBUTerol    90 MICROgram(s) HFA Inhaler 2 Puff(s) Inhalation every 6 hours PRN Shortness of Breath and/or Wheezing  benzonatate 100 milliGRAM(s) Oral three times a day PRN Cough        Vital Signs Last 24 Hrs  T(C): 36.7 (2020 23:37), Max: 36.9 (2020 13:37)  T(F): 98.1 (2020 23:37), Max: 98.4 (2020 13:37)  HR: 62 (2020 00:24) (60 - 78)  BP: 126/65 (2020 00:24) (110/68 - 127/80)  BP(mean): --  RR: 20 (2020 00:24) (18 - 30)  SpO2: 95% (2020 00:24) (95% - 97%)      POCT Blood Glucose.: 109 mg/dL (2020 08:30)    I&O's Summary    2020 07:01  -  2020 13:16  --------------------------------------------------------  IN: 0 mL / OUT: 350 mL / NET: -350 mL        PHYSICAL EXAM:  GENERAL: NAD, well-developed, thin man  HEAD:  Atraumatic, Normocephalic  EYES: EOMI, PERRLA, conjunctiva and sclera clear  NECK: Supple, No JVD  CHEST/LUNG: bibasilar rales, no wheeze  HEART: Regular rate and rhythm;   ABDOMEN: Soft, Nontender, Nondistended  EXTREMITIES:  2+ Peripheral Pulses, No clubbing, cyanosis, or edema  PSYCH: AAOx3  NEUROLOGY: non-focal  SKIN: No rashes or lesions    LABS:                        15.3   12.25 )-----------( 257      ( 2020 05:35 )             46.2     04-03    134<L>  |  98  |  23  ----------------------------<  124<H>  4.3   |  27  |  0.67    Ca    9.0      2020 05:35  Phos  2.5     04-03  Mg     2.5     04-03    TPro  7.0  /  Alb  3.3  /  TBili  0.6  /  DBili  x   /  AST  57<H>  /  ALT  67<H>  /  AlkPhos  93  04-03      CARDIAC MARKERS ( 2020 14:29 )  x     / x     / 55 u/L / < 1.00 ng/mL / x          Urinalysis Basic - ( 2020 08:48 )    Color: YELLOW / Appearance: CLEAR / S.038 / pH: 6.5  Gluc: NEGATIVE / Ketone: NEGATIVE  / Bili: NEGATIVE / Urobili: NORMAL   Blood: NEGATIVE / Protein: 100 / Nitrite: NEGATIVE   Leuk Esterase: NEGATIVE / RBC: 0-2 / WBC 6-10   Sq Epi: FEW / Non Sq Epi: x / Bacteria: NEGATIVE        RADIOLOGY & ADDITIONAL TESTS:    Imaging Personally Reviewed: < from: Xray Chest 1 View AP/PA (20 @ 13:38) >   Scattered airspace opacities identified bilaterally, right greater than left, consistent with bilateral pneumonia.    < end of copied text >      Consultant(s) Notes Reviewed:      Care Discussed with Consultants/Other Providers:    Assessment and Plan:

## 2020-04-03 NOTE — PROGRESS NOTE ADULT - PROBLEM SELECTOR PLAN 1
-COVID19 + on 4/2  -hypoxic respiratory failure requiring supplemental 02 via NC. 93% on 2 L NC  -start Plaquenil  -monitor respiratory status  -no nebs/highflow/bipap

## 2020-04-03 NOTE — PROGRESS NOTE ADULT - PROBLEM SELECTOR PLAN 2
-acute hypoxic respiratory failure 2/2 covid 19   -c/w supplemental 02  -monitor [id number] and keep Sao2>92%

## 2020-04-04 LAB
CULTURE RESULTS: SIGNIFICANT CHANGE UP
SPECIMEN SOURCE: SIGNIFICANT CHANGE UP

## 2020-04-04 PROCEDURE — 99232 SBSQ HOSP IP/OBS MODERATE 35: CPT

## 2020-04-04 RX ADMIN — Medication 500 MILLIGRAM(S): at 05:27

## 2020-04-04 RX ADMIN — ENOXAPARIN SODIUM 40 MILLIGRAM(S): 100 INJECTION SUBCUTANEOUS at 12:39

## 2020-04-04 RX ADMIN — ZINC SULFATE TAB 220 MG (50 MG ZINC EQUIVALENT) 220 MILLIGRAM(S): 220 (50 ZN) TAB at 12:39

## 2020-04-04 RX ADMIN — Medication 500 MILLIGRAM(S): at 20:19

## 2020-04-04 RX ADMIN — Medication 500 MILLIGRAM(S): at 12:39

## 2020-04-04 RX ADMIN — Medication 200 MILLIGRAM(S): at 15:33

## 2020-04-04 RX ADMIN — Medication 200 MILLIGRAM(S): at 20:19

## 2020-04-04 NOTE — PROGRESS NOTE ADULT - SUBJECTIVE AND OBJECTIVE BOX
Patient is a 74y old  Male who presents with a chief complaint of Suspected 2019 Novel Coronavirus Infection and Hypoxia (2020 12:25)    SUBJECTIVE / OVERNIGHT EVENTS: Denies cough or shortness of breath. Denies any fevers. No chest pain. Saturating 93% on 3L NC. Feels depressed about the whole situation    MEDICATIONS  (STANDING):  ascorbic acid 500 milliGRAM(s) Oral three times a day  enoxaparin Injectable 40 milliGRAM(s) SubCutaneous daily  ergocalciferol 41822 Unit(s) Oral <User Schedule>  hydroxychloroquine   Oral   hydroxychloroquine 400 milliGRAM(s) Oral every 12 hours  zinc sulfate 220 milliGRAM(s) Oral daily    MEDICATIONS  (PRN):  acetaminophen   Tablet .. 650 milliGRAM(s) Oral every 6 hours PRN Temp greater or equal to 38C (100.4F), Mild Pain (1 - 3)  ALBUTerol    90 MICROgram(s) HFA Inhaler 2 Puff(s) Inhalation every 6 hours PRN Shortness of Breath and/or Wheezing  benzonatate 100 milliGRAM(s) Oral three times a day PRN Cough    Vital Signs Last 24 Hrs  T(C): 36.8 (2020 05:45), Max: 37.1 (2020 21:21)  T(F): 98.3 (2020 05:45), Max: 98.7 (2020 21:21)  HR: 71 (2020 05:45) (62 - 71)  BP: 112/64 (2020 05:45) (110/63 - 116/56)  BP(mean): --  RR: 18 (2020 05:45) (18 - 18)  SpO2: 93% (2020 05:45) (92% - 93%)    CAPILLARY BLOOD GLUCOSE      PHYSICAL EXAM:  GENERAL: NAD, well-developed, thin man  HEAD:  Atraumatic, Normocephalic  EYES: EOMI, PERRLA  NECK: Supple  CHEST/LUNG: no accessary muscle use   HEART: Regular rate and rhythm;   ABDOMEN: Soft, Nontender, Nondistended  EXTREMITIES:  no edema  PSYCH: AAOx3  NEUROLOGY: non-focal  SKIN: No rashes or lesions    LABS:                                   15.3   12.25 )-----------( 257      ( 2020 05:35 )             46.2   04-03    134<L>  |  98  |  23  ----------------------------<  124<H>  4.3   |  27  |  0.67    Ca    9.0      2020 05:35  Phos  2.5     04-03  Mg     2.5     04-03    TPro  7.0  /  Alb  3.3  /  TBili  0.6  /  DBili  x   /  AST  57<H>  /  ALT  67<H>  /  AlkPhos  93  04-03          Urinalysis Basic - ( 2020 08:48 )    Color: YELLOW / Appearance: CLEAR / S.038 / pH: 6.5  Gluc: NEGATIVE / Ketone: NEGATIVE  / Bili: NEGATIVE / Urobili: NORMAL   Blood: NEGATIVE / Protein: 100 / Nitrite: NEGATIVE   Leuk Esterase: NEGATIVE / RBC: 0-2 / WBC 6-10   Sq Epi: FEW / Non Sq Epi: x / Bacteria: NEGATIVE        RADIOLOGY & ADDITIONAL TESTS:    Imaging Personally Reviewed: < from: Xray Chest 1 View AP/PA (20 @ 13:38) >   Scattered airspace opacities identified bilaterally, right greater than left, consistent with bilateral pneumonia.    < end of copied text >      Consultant(s) Notes Reviewed:      Care Discussed with Consultants/Other Providers:    Assessment and Plan:

## 2020-04-04 NOTE — PROGRESS NOTE ADULT - PROBLEM SELECTOR PLAN 2
-acute hypoxic respiratory failure 2/2 covid 19   -c/w supplemental 02  -monitor [id number] and keep Sao2>92%

## 2020-04-04 NOTE — PROGRESS NOTE ADULT - ASSESSMENT
74 year old male, with past history significant for Smoking, presented to the ED, after being sent from [facility], due to hypoxia found to have COVID19 infection

## 2020-04-04 NOTE — PROGRESS NOTE ADULT - PROBLEM SELECTOR PLAN 1
-COVID19 + on 4/2  -hypoxic respiratory failure requiring supplemental 02 via NC. 93% on 3 L NC  -c/w Plaquenil  -monitor respiratory status  -no nebs/highflow/bipap

## 2020-04-05 PROCEDURE — 99232 SBSQ HOSP IP/OBS MODERATE 35: CPT

## 2020-04-05 RX ADMIN — Medication 500 MILLIGRAM(S): at 04:40

## 2020-04-05 RX ADMIN — Medication 200 MILLIGRAM(S): at 13:39

## 2020-04-05 RX ADMIN — ENOXAPARIN SODIUM 40 MILLIGRAM(S): 100 INJECTION SUBCUTANEOUS at 13:36

## 2020-04-05 RX ADMIN — Medication 500 MILLIGRAM(S): at 13:36

## 2020-04-05 NOTE — PROGRESS NOTE ADULT - PROBLEM SELECTOR PLAN 2
-acute hypoxic respiratory failure 2/2 covid 19   -c/w supplemental 02  -monitor [id number] and keep Sao2>92%

## 2020-04-05 NOTE — PROGRESS NOTE ADULT - ASSESSMENT
74 year old male, with past history significant for Smoking, presented to the ED, after being sent from [facility], due to hypoxia found to have COVID19 infection

## 2020-04-05 NOTE — PROGRESS NOTE ADULT - SUBJECTIVE AND OBJECTIVE BOX
Patient is a 74y old  Male who presents with a chief complaint of Suspected 2019 Novel Coronavirus Infection and Hypoxia (2020 12:25)    SUBJECTIVE / OVERNIGHT EVENTS: Denies cough or shortness of breath. Denies any fevers. No chest pain. Saturating 93% on 2L NC. Feels depressed about the whole situation    MEDICATIONS  (STANDING):  ascorbic acid 500 milliGRAM(s) Oral three times a day  enoxaparin Injectable 40 milliGRAM(s) SubCutaneous daily  ergocalciferol 79520 Unit(s) Oral <User Schedule>  hydroxychloroquine   Oral   hydroxychloroquine 400 milliGRAM(s) Oral every 12 hours  zinc sulfate 220 milliGRAM(s) Oral daily    MEDICATIONS  (PRN):  acetaminophen   Tablet .. 650 milliGRAM(s) Oral every 6 hours PRN Temp greater or equal to 38C (100.4F), Mild Pain (1 - 3)  ALBUTerol    90 MICROgram(s) HFA Inhaler 2 Puff(s) Inhalation every 6 hours PRN Shortness of Breath and/or Wheezing  benzonatate 100 milliGRAM(s) Oral three times a day PRN Cough    Vital Signs Last 24 Hrs  T(C): 36.6 (2020 04:40), Max: 37 (2020 15:25)  T(F): 97.9 (2020 04:40), Max: 98.6 (2020 15:25)  HR: 62 (2020 04:40) (62 - 77)  BP: 107/62 (2020 04:40) (106/64 - 121/66)  BP(mean): --  RR: 18 (2020 04:40) (17 - 18)  SpO2: 93% (2020 04:40) (90% - 93%)      PHYSICAL EXAM:  GENERAL: NAD, well-developed, thin man  HEAD:  Atraumatic, Normocephalic  EYES: EOMI, PERRLA  NECK: Supple  CHEST/LUNG: no accessary muscle use   HEART: Regular rate and rhythm;   ABDOMEN: Soft, Nontender, Nondistended  EXTREMITIES:  no edema  PSYCH: AAOx3  NEUROLOGY: non-focal  SKIN: No rashes or lesions    LABS:                       Urinalysis Basic - ( 2020 08:48 )    Color: YELLOW / Appearance: CLEAR / S.038 / pH: 6.5  Gluc: NEGATIVE / Ketone: NEGATIVE  / Bili: NEGATIVE / Urobili: NORMAL   Blood: NEGATIVE / Protein: 100 / Nitrite: NEGATIVE   Leuk Esterase: NEGATIVE / RBC: 0-2 / WBC 6-10   Sq Epi: FEW / Non Sq Epi: x / Bacteria: NEGATIVE        RADIOLOGY & ADDITIONAL TESTS:    Imaging Personally Reviewed: < from: Xray Chest 1 View AP/PA (20 @ 13:38) >   Scattered airspace opacities identified bilaterally, right greater than left, consistent with bilateral pneumonia.    < end of copied text >      Consultant(s) Notes Reviewed:      Care Discussed with Consultants/Other Providers:    Assessment and Plan:

## 2020-04-05 NOTE — PROGRESS NOTE ADULT - PROBLEM SELECTOR PLAN 1
-COVID19 + on 4/2  -hypoxic respiratory failure requiring supplemental 02 via NC. 93% on 2 L NC  -c/w Plaquenil  -monitor respiratory status  -no nebs/highflow/bipap

## 2020-04-05 NOTE — PROVIDER CONTACT NOTE (OTHER) - ACTION/TREATMENT ORDERED:
Provide continuous education about importance of taking the medication for virus. Patient is alert and oriented x4, no further action if still refusing.

## 2020-04-05 NOTE — PROVIDER CONTACT NOTE (OTHER) - RECOMMENDATIONS
Educate patient about the importance of complying with medication treatment and the repercussions of not taking meds. Educate on how symptoms could worsen/risks vs benefits.

## 2020-04-05 NOTE — PROVIDER CONTACT NOTE (OTHER) - SITUATION
Patient refusing plaquenil and ascorbic acid medications. states the medications do not make him feel well.

## 2020-04-06 VITALS
OXYGEN SATURATION: 93 % | RESPIRATION RATE: 20 BRPM | HEART RATE: 98 BPM | SYSTOLIC BLOOD PRESSURE: 126 MMHG | DIASTOLIC BLOOD PRESSURE: 69 MMHG

## 2020-04-06 PROCEDURE — 99239 HOSP IP/OBS DSCHRG MGMT >30: CPT

## 2020-04-06 RX ORDER — HYDROXYCHLOROQUINE SULFATE 200 MG
200 TABLET ORAL
Qty: 4 | Refills: 0
Start: 2020-04-06 | End: 2020-04-07

## 2020-04-06 RX ORDER — ALBUTEROL 90 UG/1
2 AEROSOL, METERED ORAL
Qty: 1 | Refills: 0
Start: 2020-04-06 | End: 2020-05-05

## 2020-04-06 RX ORDER — ACETAMINOPHEN 500 MG
2 TABLET ORAL
Qty: 30 | Refills: 0
Start: 2020-04-06

## 2020-04-06 NOTE — DISCHARGE NOTE NURSING/CASE MANAGEMENT/SOCIAL WORK - NSDCPEPTSTRK_GEN_ALL_CORE
Prescribed medications/Risk factors for stroke/Stroke support groups for patients, families, and friends/Call 911 for stroke/Need for follow up after discharge/Stroke education booklet/Stroke warning signs and symptoms/Signs and symptoms of stroke

## 2020-04-06 NOTE — PROGRESS NOTE ADULT - PROBLEM SELECTOR PLAN 1
-COVID19 + on 4/2  -hypoxic respiratory failure requiring supplemental 02 via NC now off of supplemental oxygen, RA O2 sat is 93% check ambulatory sats if stable and not requiring oxygen will d/c home later today, discussed with pt's wife Marge, agreeable with the plan  d/c home later today if stable, d/c planning time spent in coordination 40 mts ( preparing d/c summary and plan)  -c/w Plaquenil  -monitor respiratory status  -no nebs/highflow/bipap

## 2020-04-06 NOTE — PROGRESS NOTE ADULT - PROBLEM SELECTOR PLAN 2
-acute hypoxic respiratory failure 2/2 covid 19   -c/w supplemental 02 as needed  -monitor [id number] and keep Sao2>92%

## 2020-04-06 NOTE — PROGRESS NOTE ADULT - SUBJECTIVE AND OBJECTIVE BOX
Patient is a 74y old  Male who presents with a chief complaint of Suspected 2019 Novel Coronavirus Infection and Hypoxia (05 Apr 2020 12:42)      SUBJECTIVE / OVERNIGHT EVENTS: Pt seen and examined at 1:15pm,  no overnight events, pt reports no sob, cough or any other complaints, is asking to go home, no issues reported by nsg.    MEDICATIONS  (STANDING):  ascorbic acid 500 milliGRAM(s) Oral three times a day  enoxaparin Injectable 40 milliGRAM(s) SubCutaneous daily  ergocalciferol 79541 Unit(s) Oral <User Schedule>  hydroxychloroquine   Oral   hydroxychloroquine 200 milliGRAM(s) Oral every 12 hours  zinc sulfate 220 milliGRAM(s) Oral daily    MEDICATIONS  (PRN):  acetaminophen   Tablet .. 650 milliGRAM(s) Oral every 6 hours PRN Temp greater or equal to 38C (100.4F), Mild Pain (1 - 3)  ALBUTerol    90 MICROgram(s) HFA Inhaler 2 Puff(s) Inhalation every 6 hours PRN Shortness of Breath and/or Wheezing  benzonatate 100 milliGRAM(s) Oral three times a day PRN Cough      Vital Signs Last 24 Hrs  T(C): 36.7 (06 Apr 2020 04:19), Max: 36.7 (05 Apr 2020 22:21)  T(F): 98.1 (06 Apr 2020 04:19), Max: 98.1 (06 Apr 2020 04:19)  HR: 98 (06 Apr 2020 12:01) (69 - 98)  BP: 126/69 (06 Apr 2020 12:01) (110/61 - 126/69)  BP(mean): --  RR: 20 (06 Apr 2020 12:01) (20 - 20)  SpO2: 93% (06 Apr 2020 12:01) (93% - 93%)  CAPILLARY BLOOD GLUCOSE        I&O's Summary      PHYSICAL EXAM:  GENERAL: NAD, thinly built male  CHEST/LUNG: Breathing comfortably, no accessary muscle use   HEART: No tachycardia on vital sings  ABDOMEN: Soft, Nontender, Nondistended  EXTREMITIES:  no edema  PSYCH: Calm  NEUROLOGY: AA, answers questions appropriately      LABS:                    RADIOLOGY & ADDITIONAL TESTS:    Imaging Personally Reviewed:    Consultant(s) Notes Reviewed:      Care Discussed with Consultants/Other Providers:

## 2020-04-06 NOTE — PROGRESS NOTE ADULT - PROBLEM/PLAN-1
DISPLAY PLAN FREE TEXT
home w/ home PT

## 2020-04-06 NOTE — DISCHARGE NOTE NURSING/CASE MANAGEMENT/SOCIAL WORK - PATIENT PORTAL LINK FT
You can access the FollowMyHealth Patient Portal offered by [facility] by registering at the following website: [email or web address]. By joining [facility]’s FollowMyHealth portal, you will also be able to view your health information using other applications (apps) compatible with our system.

## 2020-04-06 NOTE — PROGRESS NOTE ADULT - ASSESSMENT
74 year old male, with past history significant for Smoking, presented to the ED, after being sent from [facility], due to hypoxia found to have COVID19 infection

## 2020-04-06 NOTE — PROGRESS NOTE ADULT - REASON FOR ADMISSION
Suspected 2019 Novel Coronavirus Infection and Hypoxia